# Patient Record
Sex: FEMALE | Race: WHITE | NOT HISPANIC OR LATINO
[De-identification: names, ages, dates, MRNs, and addresses within clinical notes are randomized per-mention and may not be internally consistent; named-entity substitution may affect disease eponyms.]

---

## 2018-03-20 ENCOUNTER — RESULT REVIEW (OUTPATIENT)
Age: 63
End: 2018-03-20

## 2022-04-07 ENCOUNTER — APPOINTMENT (OUTPATIENT)
Dept: ORTHOPEDIC SURGERY | Facility: CLINIC | Age: 67
End: 2022-04-07
Payer: COMMERCIAL

## 2022-04-07 VITALS — HEIGHT: 64 IN | WEIGHT: 181 LBS | BODY MASS INDEX: 30.9 KG/M2

## 2022-04-07 DIAGNOSIS — Z78.9 OTHER SPECIFIED HEALTH STATUS: ICD-10-CM

## 2022-04-07 PROBLEM — Z00.00 ENCOUNTER FOR PREVENTIVE HEALTH EXAMINATION: Status: ACTIVE | Noted: 2022-04-07

## 2022-04-07 PROCEDURE — 20611 DRAIN/INJ JOINT/BURSA W/US: CPT

## 2022-04-07 RX ORDER — HYALURONATE SODIUM 20 MG/2 ML
20 SYRINGE (ML) INTRAARTICULAR
Refills: 0 | Status: COMPLETED | OUTPATIENT
Start: 2022-04-07

## 2022-04-07 NOTE — HISTORY OF PRESENT ILLNESS
[Gradual] : gradual [5] : 5 [Dull/Aching] : dull/aching [Household chores] : household chores [Rest] : rest [Stairs] : stairs [2] : 2 [Euflexxa] : Euflexxa [de-identified] : The patient feels better after the first Euflexxa injection right knee.  She has mild pain standing and walking.  Mild pain with stairs and movie sign [] : Post Surgical Visit: no [FreeTextEntry1] : R Knee  [de-identified] : 3/31/2022 [de-identified] : Dr Joseph  [de-identified] : Euflexxa Injection  [de-identified] : 3/31/2022

## 2022-04-07 NOTE — DISCUSSION/SUMMARY
[de-identified] : I discussed Euflexxa injections right knee. Risks benefits and the alternatives were discussed. She would like to try this Continue rachid and Aleve p.r.n.   Impression: Mild osteoarthritis right knee/chondromalacia patella/tear medial meniscus

## 2022-04-07 NOTE — PROCEDURE
[Right] : of the right [Knee] : knee [Euflexxa] : Euflexxa [#2] : series #2 [Risks, benefits, alternatives discussed / Verbal consent obtained] : the risks benefits, and alternatives have been discussed, and verbal consent was obtained [de-identified] : To insure intra-articular injection [FreeTextEntry3] : An injection of Euflexxa 2 mL # 2 was injected into the Right knee(s). after verbal consent using sterile technique. The risks, benefits, and alternatives to Viscosupplementation injection were explained in full to the patient. Risks outlined include but are not limited to infection, sepsis, bleeding, scarring, skin discoloration, temporary increase in pain, syncopal episode, failure to resolve symptoms, allergic reaction, and symptom recurrence. Signs and symptoms of infection reviewed and patient advised to call immediately for redness, fevers, and/or chills. Patient understood the risks. All questions were answered. After discussion of options, patient requested Viscosupplementation. Oral informed consent was obtained and sterile prep was done of the injection site. Sterile technique was used without complications. The patient tolerated the procedure well. Ice tonight to the injection site. Do not submerge underwater limit activities for 24 hours.

## 2022-04-07 NOTE — PHYSICAL EXAM
[Normal Mood and Affect] : normal mood and affect [Able to Communicate] : able to communicate [Well Developed] : well developed [Well Nourished] : well nourished [Right] : right foot and ankle [FreeTextEntry3] : No swelling [de-identified] : Normal gait [TWNoteComboBox7] : flexion 125 degrees [de-identified] : extension 7 degrees [] : no calf tenderness

## 2022-04-14 ENCOUNTER — APPOINTMENT (OUTPATIENT)
Dept: ORTHOPEDIC SURGERY | Facility: CLINIC | Age: 67
End: 2022-04-14
Payer: COMMERCIAL

## 2022-04-14 VITALS — HEIGHT: 64 IN | BODY MASS INDEX: 30.9 KG/M2 | WEIGHT: 181 LBS

## 2022-04-14 PROCEDURE — 20611 DRAIN/INJ JOINT/BURSA W/US: CPT

## 2022-04-14 RX ORDER — HYALURONATE SODIUM 20 MG/2 ML
20 SYRINGE (ML) INTRAARTICULAR
Refills: 0 | Status: COMPLETED | OUTPATIENT
Start: 2022-04-14

## 2022-04-14 RX ADMIN — Medication MG/2ML: at 00:00

## 2022-04-14 NOTE — HISTORY OF PRESENT ILLNESS
[Gradual] : gradual [Dull/Aching] : dull/aching [Intermittent] : intermittent [Stairs] : stairs [Full time] : Work status: full time [Euflexxa] : Euflexxa [5] : 5 [Household chores] : household chores [Rest] : rest [2] : 2 [de-identified] : The patient feels better after the second Euflexxa injection right knee.  She has mild pain standing and walking.  Mild pain with stairs and movie sign [] : Post Surgical Visit: no [FreeTextEntry1] : R Knee  [de-identified] : 3/31/2022 [de-identified] : Dr Joseph  [de-identified] : Euflexxa Injection  [de-identified] : Chiropractor [de-identified] : 3/31/2022

## 2022-04-14 NOTE — PHYSICAL EXAM
[Normal Mood and Affect] : normal mood and affect [Able to Communicate] : able to communicate [Well Developed] : well developed [Well Nourished] : well nourished [Right] : right knee [FreeTextEntry3] : No swelling.  5 mm area of purplish discoloration lateral knee in the region of the prior injection [de-identified] : Normal gait [TWNoteComboBox7] : flexion 125 degrees [de-identified] : extension 7 degrees [] : no calf tenderness

## 2022-04-14 NOTE — PROCEDURE
[Right] : of the right [Knee] : knee [Euflexxa] : Euflexxa [Risks, benefits, alternatives discussed / Verbal consent obtained] : the risks benefits, and alternatives have been discussed, and verbal consent was obtained [#3] : series #3 [de-identified] : To insure intra-articular injection [FreeTextEntry3] : An injection of Euflexxa 2 mL # 3 was injected into the Right knee(s). after verbal consent using sterile technique. The risks, benefits, and alternatives to Viscosupplementation injection were explained in full to the patient. Risks outlined include but are not limited to infection, sepsis, bleeding, scarring, skin discoloration, temporary increase in pain, syncopal episode, failure to resolve symptoms, allergic reaction, and symptom recurrence. Signs and symptoms of infection reviewed and patient advised to call immediately for redness, fevers, and/or chills. Patient understood the risks. All questions were answered. After discussion of options, patient requested Viscosupplementation. Oral informed consent was obtained and sterile prep was done of the injection site. Sterile technique was used without complications. The patient tolerated the procedure well. Ice tonight to the injection site. Do not submerge underwater limit activities for 24 hours.\par Injection was given medially

## 2022-04-14 NOTE — DISCUSSION/SUMMARY
[de-identified] :  Continue ice and Aleve p.r.n.\par Continue home exercises\par \par    Impression:\par  Mild osteoarthritis right knee/chondromalacia patella/tear medial meniscus

## 2022-06-02 ENCOUNTER — NON-APPOINTMENT (OUTPATIENT)
Age: 67
End: 2022-06-02

## 2022-06-23 ENCOUNTER — APPOINTMENT (OUTPATIENT)
Dept: ORTHOPEDIC SURGERY | Facility: CLINIC | Age: 67
End: 2022-06-23
Payer: COMMERCIAL

## 2022-06-23 VITALS — WEIGHT: 181 LBS | BODY MASS INDEX: 30.9 KG/M2 | HEIGHT: 64 IN

## 2022-06-23 PROCEDURE — 99213 OFFICE O/P EST LOW 20 MIN: CPT | Mod: 25

## 2022-06-23 PROCEDURE — 20611 DRAIN/INJ JOINT/BURSA W/US: CPT

## 2022-06-23 RX ADMIN — TRIAMCINOLONE ACETONIDE EXTENDED-RELEASE INJECTABLE SUSPENSION MG: KIT INTRA-ARTICULAR at 00:00

## 2022-06-23 NOTE — PHYSICAL EXAM
[Normal Mood and Affect] : normal mood and affect [Able to Communicate] : able to communicate [Well Developed] : well developed [Well Nourished] : well nourished [Right] : right knee [5___] : quadriceps 5[unfilled]/5 [FreeTextEntry3] : No swelling.  [de-identified] : Normal gait [TWNoteComboBox7] : flexion 125 degrees [de-identified] : extension 0 degrees [] : no calf tenderness [2+] : posterior tibialis pulse: 2+

## 2022-06-23 NOTE — PROCEDURE
[Right] : of the right [Knee] : knee [___ cc    32 units 5mg] : Zilretta ~Vcc of 32 units 5 mg  [] : Patient tolerated procedure well [Patient was advised to rest the joint(s) for ____ days] : patient was advised to rest the joint(s) for [unfilled] days [Risks, benefits, alternatives discussed / Verbal consent obtained] : the risks benefits, and alternatives have been discussed, and verbal consent was obtained [All ultrasound images have been permanently captured and stored accordingly in our picture archiving and communication system] : All ultrasound images have been permanently captured and stored accordingly in our picture archiving and communication system [de-identified] : To insure intra-articular injection [FreeTextEntry3] : Do not submerge underwater for 48 hours

## 2022-06-23 NOTE — DISCUSSION/SUMMARY
[de-identified] :  Continue ice p.r.n.\par Diclofenac 50 mg b.i.d. with food p.r.n.\par Continue home exercises\par I discussed Zilretta injection and gave her a pamphlet.  Risks benefits and the alternatives were discussed.  She would like to try this\par I discussed possible arthroscopy and debridement if ongoing symptoms.  She is not interested in this at the present time\par She will call me in 2 weeks to let me know how she is feeling\par \par    Impression:\par  Mild osteoarthritis right knee/chondromalacia patella/tear medial meniscus

## 2022-06-23 NOTE — HISTORY OF PRESENT ILLNESS
[Gradual] : gradual [4] : 4 [Dull/Aching] : dull/aching [Intermittent] : intermittent [Rest] : rest [Injection therapy] : injection therapy [Stairs] : stairs [Retired] : Work status: retired [1] : 1 [Other:____] : [unfilled] [de-identified] : The patient has continued pain right knee.  Mild pain standing and walking.  Mild to moderate pain with stairs and movie sign.  Pain awakens her from sleep at night.  Doing home exercises.  Taking diclofenac p.r.n.  Minimal improvement after completing Euflexxa injections [] : Post Surgical Visit: no

## 2023-03-02 ENCOUNTER — APPOINTMENT (OUTPATIENT)
Dept: ORTHOPEDIC SURGERY | Facility: CLINIC | Age: 68
End: 2023-03-02
Payer: COMMERCIAL

## 2023-03-02 VITALS — BODY MASS INDEX: 30.9 KG/M2 | WEIGHT: 181 LBS | HEIGHT: 64 IN

## 2023-03-02 PROCEDURE — 99214 OFFICE O/P EST MOD 30 MIN: CPT

## 2023-03-02 PROCEDURE — 73562 X-RAY EXAM OF KNEE 3: CPT | Mod: RT

## 2023-03-02 RX ORDER — DICLOFENAC SODIUM 50 MG/1
50 TABLET, DELAYED RELEASE ORAL TWICE DAILY
Qty: 60 | Refills: 1 | Status: DISCONTINUED | COMMUNITY
Start: 2022-06-02 | End: 2023-03-02

## 2023-03-02 RX ORDER — ALENDRONATE SODIUM 70 MG/1
70 TABLET ORAL
Refills: 0 | Status: ACTIVE | COMMUNITY
Start: 2023-03-02

## 2023-03-02 NOTE — HISTORY OF PRESENT ILLNESS
[Gradual] : gradual [6] : 6 [Dull/Aching] : dull/aching [Localized] : localized [Constant] : constant [Household chores] : household chores [Leisure] : leisure [Sleep] : sleep [Nothing helps with pain getting better] : Nothing helps with pain getting better [Walking] : walking [Full time] : Work status: full time [de-identified] : The patient has had increased pain right knee with occasional catching over the past few weeks.  She has mild pain standing and walking.  Pain with stairs and movie sign.  Doing home exercises.  She did have some improvement after previous Euflexxa injections [] : Post Surgical Visit: no [de-identified] : 6/23/2022 [de-identified] : Dr. Joseph  [de-identified] : Chiropractor

## 2023-03-02 NOTE — IMAGING
[de-identified] : Normal gait\par \par Right knee\par No swelling\par Mild medial facet and joint line tenderness\par Passive range of motion 0° to 125°\par Ligaments are stable\par Quad strength 5/5\par \par Right leg\par No swelling\par Calf is soft and nontender\par Dorsalis pedis pulse 2+ [Right] : right knee [FreeTextEntry9] : Reviewed and interpreted.  Right knee AP standing, lateral, sunrise views-mild to moderate degenerative changes with narrowing the medial compartment on AP standing view, spurring patellofemoral joint

## 2023-03-02 NOTE — DISCUSSION/SUMMARY
[de-identified] : Various options were discussed with the patient.  I discussed arthroscopy, possible meniscectomy and debridement.  Risks benefits and the alternatives were discussed.\par She is not interested in this at the present time\par I discussed TriVisc injections and gave her a pamphlet.  Risks benefits and the alternatives were discussed.  She would like to do this\par Ice p.r.n.\par Continue home exercises\par \par Impression:\par Mild to moderate osteoarthritis right knee/chondromalacia patella/tear medial meniscus

## 2023-03-02 NOTE — DATA REVIEWED
[MRI] : MRI [Right] : of the right [Knee] : knee [I independently reviewed and interpreted images and report] : I independently reviewed and interpreted images and report [FreeTextEntry1] : MRI right knee done at Dr. Yang March 8, 2022 was reviewed. There is degenerative tear posterior horn medial meniscus. Mild tricompartmental degenerative changes. Small popliteal cyst

## 2023-03-14 ENCOUNTER — APPOINTMENT (OUTPATIENT)
Dept: ORTHOPEDIC SURGERY | Facility: CLINIC | Age: 68
End: 2023-03-14

## 2023-03-30 ENCOUNTER — APPOINTMENT (OUTPATIENT)
Dept: ORTHOPEDIC SURGERY | Facility: CLINIC | Age: 68
End: 2023-03-30
Payer: COMMERCIAL

## 2023-03-30 VITALS — HEIGHT: 64 IN | BODY MASS INDEX: 30.9 KG/M2 | WEIGHT: 181 LBS

## 2023-03-30 PROCEDURE — 20611 DRAIN/INJ JOINT/BURSA W/US: CPT

## 2023-03-30 RX ORDER — HYALURONATE SODIUM 10 MG/ML
25 SYRINGE (ML) INTRAARTICULAR
Refills: 0 | Status: COMPLETED | OUTPATIENT
Start: 2023-03-30

## 2023-03-30 RX ADMIN — Medication MG/2.5ML: at 00:00

## 2023-03-30 NOTE — PROCEDURE
[Large Joint Injection] : Large joint injection [Right] : of the right [Knee] : knee [Pain] : pain [Alcohol] : alcohol [Betadine] : betadine [Ethyl Chloride sprayed topically] : ethyl chloride sprayed topically [Sterile technique used] : sterile technique used [Trivisc (25mg)] : 25mg of Trivisc [#1] : series #1 [] : Patient tolerated procedure well [Patient was advised to rest the joint(s) for ____ days] : patient was advised to rest the joint(s) for [unfilled] days [Risks, benefits, alternatives discussed / Verbal consent obtained] : the risks benefits, and alternatives have been discussed, and verbal consent was obtained [All ultrasound images have been permanently captured and stored accordingly in our picture archiving and communication system] : All ultrasound images have been permanently captured and stored accordingly in our picture archiving and communication system [de-identified] : To ensure intra-articular injection [FreeTextEntry3] : Do not submerge underwater for 24 hours

## 2023-03-30 NOTE — HISTORY OF PRESENT ILLNESS
[Gradual] : gradual [3] : 3 [Dull/Aching] : dull/aching [Intermittent] : intermittent [Leisure] : leisure [Rest] : rest [Stairs] : stairs [Retired] : Work status: retired [1] : 1 [Other:____] : [unfilled] [de-identified] : The patient has continued pain right knee.  Mild pain standing and walking.  Pain with stairs and movie sign [] : Post Surgical Visit: no [de-identified] : 3/2/2023 [de-identified] : Dr. Joseph

## 2023-03-30 NOTE — IMAGING
[de-identified] : Normal gait\par \par Right knee\par No swelling\par Mild medial facet and joint line tenderness\par Passive range of motion 0° to 125°\par \par \par Right leg\par No swelling\par Calf is soft and nontender\par

## 2023-03-30 NOTE — DISCUSSION/SUMMARY
[de-identified] : She would like to proceed with TriVisc injections right knee.  Risk benefits and the alternatives were discussed.\par She will avoid irritating activities and sports\par Ice p.r.n.\par Continue home exercises\par \par Impression:\par Mild to moderate osteoarthritis right knee/chondromalacia patella/tear medial meniscus

## 2023-04-06 ENCOUNTER — APPOINTMENT (OUTPATIENT)
Dept: ORTHOPEDIC SURGERY | Facility: CLINIC | Age: 68
End: 2023-04-06
Payer: COMMERCIAL

## 2023-04-06 VITALS — BODY MASS INDEX: 30.9 KG/M2 | WEIGHT: 181 LBS | HEIGHT: 64 IN

## 2023-04-06 PROCEDURE — 20611 DRAIN/INJ JOINT/BURSA W/US: CPT

## 2023-04-06 RX ORDER — HYALURONATE SODIUM 10 MG/ML
25 SYRINGE (ML) INTRAARTICULAR
Refills: 0 | Status: COMPLETED | OUTPATIENT
Start: 2023-04-06

## 2023-04-06 RX ADMIN — Medication MG/2.5ML: at 00:00

## 2023-04-06 NOTE — HISTORY OF PRESENT ILLNESS
[Gradual] : gradual [3] : 3 [Dull/Aching] : dull/aching [Intermittent] : intermittent [Leisure] : leisure [Rest] : rest [Stairs] : stairs [Full time] : Work status: full time [2] : 2 [de-identified] : The patient feels a little better after the first TriVisc injection right knee.  She has mild pain standing and walking.  She feels better sleeping at night [] : Post Surgical Visit: no [de-identified] : Chiropractor [de-identified] : 3/30/2023

## 2023-04-06 NOTE — IMAGING
[de-identified] : Normal gait\par \par Right knee\par No swelling\par Mild medial facet and joint line tenderness\par Passive range of motion 0° to 125°\par \par \par Right leg\par No swelling\par Calf is soft and nontender\par

## 2023-04-06 NOTE — DISCUSSION/SUMMARY
[de-identified] : \par She will avoid irritating activities and sports\par Ice p.r.n.\par Continue home exercises\par \par Impression:\par Mild to moderate osteoarthritis right knee/chondromalacia patella/tear medial meniscus

## 2023-04-13 ENCOUNTER — APPOINTMENT (OUTPATIENT)
Dept: ORTHOPEDIC SURGERY | Facility: CLINIC | Age: 68
End: 2023-04-13
Payer: COMMERCIAL

## 2023-04-13 VITALS — HEIGHT: 64 IN | BODY MASS INDEX: 30.9 KG/M2 | WEIGHT: 181 LBS

## 2023-04-13 DIAGNOSIS — S83.231D COMPLEX TEAR OF MEDIAL MENISCUS, CURRENT INJURY, RIGHT KNEE, SUBSEQUENT ENCOUNTER: ICD-10-CM

## 2023-04-13 DIAGNOSIS — M17.11 UNILATERAL PRIMARY OSTEOARTHRITIS, RIGHT KNEE: ICD-10-CM

## 2023-04-13 DIAGNOSIS — M22.41 CHONDROMALACIA PATELLAE, RIGHT KNEE: ICD-10-CM

## 2023-04-13 PROCEDURE — 20611 DRAIN/INJ JOINT/BURSA W/US: CPT | Mod: RT

## 2023-04-13 PROCEDURE — 99213 OFFICE O/P EST LOW 20 MIN: CPT | Mod: 25

## 2023-04-13 RX ORDER — HYALURONATE SODIUM 10 MG/ML
25 SYRINGE (ML) INTRAARTICULAR
Refills: 0 | Status: COMPLETED | OUTPATIENT
Start: 2023-04-13

## 2023-04-13 RX ADMIN — Medication MG/2.5ML: at 00:00

## 2023-04-13 NOTE — HISTORY OF PRESENT ILLNESS
[Gradual] : gradual [Dull/Aching] : dull/aching [Intermittent] : intermittent [Leisure] : leisure [Rest] : rest [Stairs] : stairs [Full time] : Work status: full time [3] : 3 [Other:____] : [unfilled] [de-identified] : The patient feels little improvement after the second TriVisc injection right knee.  She has mild pain standing and walking.  She has tried to do some limited running [] : Post Surgical Visit: no [de-identified] : Chiropractor [de-identified] : 4/6/2023

## 2023-04-13 NOTE — PROCEDURE
[Large Joint Injection] : Large joint injection [Right] : of the right [Knee] : knee [Pain] : pain [Alcohol] : alcohol [Betadine] : betadine [Ethyl Chloride sprayed topically] : ethyl chloride sprayed topically [Sterile technique used] : sterile technique used [Trivisc (25mg)] : 25mg of Trivisc [] : Patient tolerated procedure well [Patient was advised to rest the joint(s) for ____ days] : patient was advised to rest the joint(s) for [unfilled] days [Risks, benefits, alternatives discussed / Verbal consent obtained] : the risks benefits, and alternatives have been discussed, and verbal consent was obtained [All ultrasound images have been permanently captured and stored accordingly in our picture archiving and communication system] : All ultrasound images have been permanently captured and stored accordingly in our picture archiving and communication system [#3] : series #3 [de-identified] : To ensure intra-articular injection [FreeTextEntry3] : Do not submerge underwater for 24 hours

## 2023-04-13 NOTE — IMAGING
[de-identified] : Normal gait\par \par Right knee\par No swelling\par Mild medial facet and joint line tenderness\par Passive range of motion 0° to 125°\par \par \par Right leg\par No swelling\par Calf is soft and nontender\par

## 2023-04-13 NOTE — DISCUSSION/SUMMARY
[de-identified] : Long-term follow-up plan was discussed\par I discussed trying physical therapy.  She did not want to do this at the present time\par I discussed possibly repeating TriVisc injections in the future if she gets a good response\par She will avoid irritating activities and sports\par Ice p.r.n.\par Continue home exercises\par \par Impression:\par Mild to moderate osteoarthritis right knee/chondromalacia patella/tear medial meniscus